# Patient Record
Sex: FEMALE | Race: WHITE | Employment: OTHER | URBAN - METROPOLITAN AREA
[De-identification: names, ages, dates, MRNs, and addresses within clinical notes are randomized per-mention and may not be internally consistent; named-entity substitution may affect disease eponyms.]

---

## 2017-01-01 ENCOUNTER — ALLIED HEALTH/NURSE VISIT (OUTPATIENT)
Dept: CARDIOLOGY | Facility: CLINIC | Age: 82
End: 2017-01-01
Payer: COMMERCIAL

## 2017-01-01 ENCOUNTER — DOCUMENTATION ONLY (OUTPATIENT)
Dept: CARDIOLOGY | Facility: CLINIC | Age: 82
End: 2017-01-01

## 2017-01-01 DIAGNOSIS — Z95.0 CARDIAC PACEMAKER IN SITU: Primary | ICD-10-CM

## 2017-01-01 PROCEDURE — 93294 REM INTERROG EVL PM/LDLS PM: CPT | Performed by: INTERNAL MEDICINE

## 2017-01-01 PROCEDURE — 93296 REM INTERROG EVL PM/IDS: CPT | Performed by: INTERNAL MEDICINE

## 2017-06-01 NOTE — PROGRESS NOTES
"Patient lost to follow-up: Patient overdue for annual threshold, LM 5/24/17 to call and make appt. Have not received a call back. Sent out \"1 week letter\" today.     "

## 2017-07-06 NOTE — PROGRESS NOTES
Medtronic Revo (D) Remote PPM Device Check  AP: 21 % : <1 %  Mode: AAIR<->DDDR        Presenting Rhythm: AFIB with controlled VS events  Heart Rate: Adequate rates per histogram  Sensing: Stable    Pacing Threshold: Stable    Impedance: Stable  Battery Status: 2.96V with MAIRA being 2.81V  Atrial Arrhythmia: 39 mode switch episodes comprising 4.6% of the time. Longest episode has been in progress since 6/21/17. Ventricular rates controlled. Taking Warfarin.    Ventricular Arrhythmia: 28 ventricular high rates. 3 EGMs available show As=Vs for PAT lasting 7-15 beats, rates 140-185bpm. 4 fast A&V rates. 2 EGMs available; 1 shows As=Vs for PAT lasting 18 beats, rates 150/175bpm and 1 EGM shows As>Vs for AFlutter lasting about 25 seconds, ventricular rates 87-185bpm. Reviewed with MARVIN Quach.       Care Plan: F/u annual threshold in 3 months. Sent letter with results. LAZARO Gastelum

## 2017-07-06 NOTE — MR AVS SNAPSHOT
After Visit Summary   7/6/2017    Destiney Powell    MRN: [id number]           Patient Information     Date Of Birth          1/6/1923        Visit Information        Provider Department      7/6/2017 3:45 PM [facility] TECH1 [facility]        Today's Diagnoses     Cardiac pacemaker in situ    -  1       Follow-ups after your visit        Additional Services     Follow-Up with Device Clinic                 Your next 10 appointments already scheduled     Jul 06, 2017  3:45 PM CDT   Remote PPM Check with [facility] TECH1   [facility] ([facility])    [address] [place] [id number] [phone]           This appointment is for a remote check of your pacemaker.  This is not an appointment at the office.              Future tests that were ordered for you today     Open Future Orders        Priority Expected Expires Ordered    Follow-Up with Device Clinic Routine 10/6/2017 7/6/2018 7/6/2017            Who to contact     If you have questions or need follow up information about today's clinic visit or your schedule please contact [facility] directly at [phone].  Normal or non-critical lab and imaging results will be communicated to you by [employer], letter or phone within 4 business days after the clinic has received the results. If you do not hear from us within 7 days, please contact the clinic through [employer] or phone. If you have a critical or abnormal lab result, we will notify you by phone as soon as possible.  Submit refill requests through [employer] or call your pharmacy and they will forward the refill request to us. Please allow 3 business days for your refill to be completed.          Additional Information About Your Visit        [employer] Information     [employer] lets you send messages to your doctor, view your test results, renew your prescriptions,  "schedule appointments and more. To sign up, go to [email or web address] . Click on \"Log in\" on the left side of the screen, which will take you to the Welcome page. Then click on \"Sign up Now\" on the right side of the page.     You will be asked to enter the access code listed below, as well as some personal information. Please follow the directions to create your username and password.     Your access code is: [email or web address]  Expires: 10/4/2017 11:31 AM     Your access code will  in 90 days. If you need help or a new code, please call your [place] clinic or [phone].        Care EveryWhere ID     This is your Care EveryWhere ID. This could be used by other organizations to access your [facility] medical records  [id number]         Blood Pressure from Last 3 Encounters:   10/17/16 132/83   10/12/16 118/82   16 146/73    Weight from Last 3 Encounters:   10/17/16 57.7 kg (127 lb 1.6 oz)   10/12/16 58 kg (127 lb 12.8 oz)   16 62.6 kg (138 lb)              We Performed the Following     INTERROGATION DEVICE EVAL REMOTE, PACER/ICD (26698)     PM DEVICE INTERROGATE REMOTE (03420)        Primary Care Provider    None Specified       No primary provider on file.        Equal Access to Services     RASHID ANN : Hadii tri ku hadasho Soluana, waaxda luqadaha, qaybta kaalmada natalia bingham. So [facility] [phone].    ATENCIÓN: Si habla español, tiene a martin disposición servicios gratuitos de asistencia lingüística. Llame al [phone].    We comply with applicable federal civil rights laws and [place] laws. We do not discriminate on the basis of race, color, national origin, age, disability sex, sexual orientation or gender identity.            Thank you!     Thank you for choosing [facility]  for your care. Our goal is always to provide you with excellent care. Hearing back from our patients is one way we can continue to " improve our services. Please take a few minutes to complete the written survey that you may receive in the mail after your visit with us. Thank you!             Your Updated Medication List - Protect others around you: Learn how to safely use, store and throw away your medicines at www.disposemymeds.org.          This list is accurate as of: 7/6/17 11:31 AM.  Always use your most recent med list.                   Brand Name Dispense Instructions for use Diagnosis    ASPIRIN PO      Take 81 mg by mouth daily        citalopram 20 MG tablet    celeXA    90 tablet    TAKE 1 TABLET (20 MG) BY MOUTH DAILY    Depression       furosemide 20 MG tablet    LASIX    45 tablet    TAKE 1 TABLET (20 MG) BY MOUTH EVERY 48 HOURS    Hypertension       levothyroxine 88 MCG tablet    SYNTHROID/LEVOTHROID    90 tablet    TAKE 1 TABLET (88 MCG) BY MOUTH DAILY    Hypothyroidism, unspecified hypothyroidism type       METOPROLOL SUCCINATE ER PO      Take 25 mg by mouth daily        potassium chloride 10 MEQ CR tablet   Generic drug:  potassium chloride SA     45 tablet    TAKE 1 TABLET (10 MEQ) BY MOUTH EVERY 48 HOURS    Chronic systolic heart failure (H), Hypertension       trolamine salicylate 10 % cream    ASPERCREME     Apply topically 3 times daily Apply to left shoulder        TYLENOL PO      Take 1,000 mg by mouth 3 times daily